# Patient Record
Sex: FEMALE | Race: WHITE | NOT HISPANIC OR LATINO | URBAN - NONMETROPOLITAN AREA
[De-identification: names, ages, dates, MRNs, and addresses within clinical notes are randomized per-mention and may not be internally consistent; named-entity substitution may affect disease eponyms.]

---

## 2018-08-06 PROBLEM — H04.123: Noted: 2018-08-06

## 2018-08-06 PROBLEM — H40.013: Noted: 2018-08-06

## 2018-08-06 PROBLEM — H26.492: Noted: 2018-08-06

## 2018-08-06 PROBLEM — Z96.1: Noted: 2018-08-06

## 2018-08-06 PROBLEM — H16.223: Noted: 2018-08-06

## 2019-04-08 ENCOUNTER — IMPORTED ENCOUNTER (OUTPATIENT)
Dept: URBAN - NONMETROPOLITAN AREA CLINIC 1 | Facility: CLINIC | Age: 84
End: 2019-04-08

## 2019-04-08 PROCEDURE — 92012 INTRM OPH EXAM EST PATIENT: CPT

## 2019-04-08 NOTE — PATIENT DISCUSSION
**Prednisone user due to Polymyalgia. GELACIO-Explained GELACIO and associated symptoms.-Recommend increasing Omega 3s.-Pt to begin artificial tears OU QID PRN. Pt will contact us if this does not provide relief. -PLUG FAILURE. -Continue Tears OU.-Continue HC/Sterilid OU QD.-Continue Genteal gel OU QHS. -Pt d/c [employer] pt didn't notice a difference with them.-Sample of Xiidra given along with discount card. Sent in rx. Glaucoma Suspect-Based on disc asym. - IOP' s today were 14 OD 16OS-Appears stable at this time.-Continue to monitor with exams and testing.s/p PCIOL OU -Stable PCIOL OU.-Monitor for PCO. Early PCO OS -Explained symptoms of advancing PCO. -Continue to monitor for now. Pt will notify us if any new symptoms develop.; 's Notes: PLUG FAILURE. VF- 24-2-5/27/14[facility] OCT - 2/7/18

## 2020-06-01 ENCOUNTER — IMPORTED ENCOUNTER (OUTPATIENT)
Dept: URBAN - NONMETROPOLITAN AREA CLINIC 1 | Facility: CLINIC | Age: 85
End: 2020-06-01

## 2020-06-01 PROCEDURE — 92014 COMPRE OPH EXAM EST PT 1/>: CPT

## 2020-06-01 NOTE — PATIENT DISCUSSION
GELACIO-Explained GELACIO and associated symptoms.-Recommend increasing Omega 3s.-Pt to begin artificial tears OU QID PRN. Pt will contact us if this does not provide relief. -PLUG FAILURE. -Continue Tears OU.-Continue HC/Sterilid OU QD.-Continue Genteal gel OU QHS. -Pt d/c [employer]asis pt didn't notice a difference with them. Glaucoma Suspect-Based on disc asym. - IOP' s today were stable ou-Appears stable at this time.-Continue to monitor with exams and testing.s/p PCIOL OU -Stable PCIOL OU.-Monitor for PCO. Early PCO OS -Explained symptoms of advancing PCO. -Continue to monitor for now. Pt will notify us if any new symptoms develop.; 's Notes: PLUG FAILURE. VF- 24-2-5/27/14[facility] OCT - 2/7/18

## 2021-10-27 ENCOUNTER — IMPORTED ENCOUNTER (OUTPATIENT)
Dept: URBAN - NONMETROPOLITAN AREA CLINIC 1 | Facility: CLINIC | Age: 86
End: 2021-10-27

## 2021-10-27 PROCEDURE — 92133 CPTRZD OPH DX IMG PST SGM ON: CPT

## 2021-10-27 PROCEDURE — 92014 COMPRE OPH EXAM EST PT 1/>: CPT

## 2021-10-27 NOTE — PATIENT DISCUSSION
GELACIO-Explained GELACIO and associated symptoms.-Recommend increasing Omega 3s.-Pt to begin artificial tears OU QID PRN. Pt will contact us if this does not provide relief. -PLUG FAILURE. -Continue Tears OU.-Continue HC/Sterilid OU QD.-Continue Genteal gel OU QHS. -Pt d/c Restasis pt didn't notice a difference with them. Glaucoma Suspect-Based on disc asym. - IOP' s today were stable ou-Appears stable at this time. -oct today stable ou-Continue to monitor with exams and testing.s/p PCIOL OU -Stable PCIOL OU.-Monitor for PCO. Early PCO OS -Explained symptoms of advancing PCO. -Continue to monitor for now. Pt will notify us if any new symptoms develop.; 's Notes: PLUG FAILURE. VF- 24-2-5/27/14[facility] OCT - 2/7/18

## 2022-04-09 ASSESSMENT — VISUAL ACUITY
OD_SC: 20/50-1
OD_SC: 20/30-1
OS_SC: 20/40+2
OS_SC: 20/25-1
OD_SC: 20/40
OS_SC: 20/30-1

## 2022-04-09 ASSESSMENT — TONOMETRY
OD_IOP_MMHG: 14
OS_IOP_MMHG: 14
OD_IOP_MMHG: 14
OD_IOP_MMHG: 17
OS_IOP_MMHG: 17
OS_IOP_MMHG: 16

## 2023-09-21 ENCOUNTER — ESTABLISHED PATIENT (OUTPATIENT)
Dept: RURAL CLINIC 2 | Facility: CLINIC | Age: 88
End: 2023-09-21

## 2023-09-21 DIAGNOSIS — H40.013: ICD-10-CM

## 2023-09-21 DIAGNOSIS — H16.223: ICD-10-CM

## 2023-09-21 DIAGNOSIS — H35.3131: ICD-10-CM

## 2023-09-21 DIAGNOSIS — Z96.1: ICD-10-CM

## 2023-09-21 DIAGNOSIS — H26.492: ICD-10-CM

## 2023-09-21 PROCEDURE — 99214 OFFICE O/P EST MOD 30 MIN: CPT

## 2023-09-21 PROCEDURE — 92134 CPTRZ OPH DX IMG PST SGM RTA: CPT

## 2023-09-21 ASSESSMENT — TONOMETRY
OD_IOP_MMHG: 16
OS_IOP_MMHG: 16

## 2023-09-21 ASSESSMENT — VISUAL ACUITY
OS_CC: 20/30-1
OD_CC: 20/40-1

## 2024-09-23 ENCOUNTER — COMPREHENSIVE EXAM (OUTPATIENT)
Dept: RURAL CLINIC 1 | Facility: CLINIC | Age: 89
End: 2024-09-23

## 2024-09-23 DIAGNOSIS — Z96.1: ICD-10-CM

## 2024-09-23 DIAGNOSIS — H40.013: ICD-10-CM

## 2024-09-23 DIAGNOSIS — H35.3131: ICD-10-CM

## 2024-09-23 DIAGNOSIS — H26.492: ICD-10-CM

## 2024-09-23 DIAGNOSIS — H16.223: ICD-10-CM

## 2024-09-23 PROCEDURE — 92014 COMPRE OPH EXAM EST PT 1/>: CPT

## 2024-09-23 PROCEDURE — 92133 CPTRZD OPH DX IMG PST SGM ON: CPT
